# Patient Record
Sex: FEMALE | URBAN - METROPOLITAN AREA
[De-identification: names, ages, dates, MRNs, and addresses within clinical notes are randomized per-mention and may not be internally consistent; named-entity substitution may affect disease eponyms.]

---

## 2018-04-03 ENCOUNTER — APPOINTMENT (RX ONLY)
Dept: URBAN - METROPOLITAN AREA CLINIC 38 | Facility: CLINIC | Age: 33
Setting detail: DERMATOLOGY
End: 2018-04-03

## 2018-04-03 DIAGNOSIS — D18.0 HEMANGIOMA: ICD-10-CM

## 2018-04-03 PROBLEM — L29.8 OTHER PRURITUS: Status: ACTIVE | Noted: 2018-04-03

## 2018-04-03 PROBLEM — D18.01 HEMANGIOMA OF SKIN AND SUBCUTANEOUS TISSUE: Status: ACTIVE | Noted: 2018-04-03

## 2018-04-03 PROBLEM — I48.91 UNSPECIFIED ATRIAL FIBRILLATION: Status: ACTIVE | Noted: 2018-04-03

## 2018-04-03 PROCEDURE — 99202 OFFICE O/P NEW SF 15 MIN: CPT

## 2018-04-03 NOTE — HPI: FREE FORM (INITIAL HISTORY)
How Severe Is Your Skin Condition? (The Patient Describes The Severity Level As....): moderate
What Brings You In Today? (This Is An Xx Year Old Patient Who Presents For...): Lesion
When Did You First Notice It? (The Patient First Noticed It...): 2 years
Where On Your Body Is It? (Located On...): Right thigh
Any Associated Symptoms? (The Symptoms Include.....): Red
What Previous Treatments Have You Tried? (The Patient Has Tried The Following Treatments...): No treatment
Did Anything Happen To Make You Want To Come In For This Specifically Today? (The Specific Reason That The Patient Came In Today Was Because:): Evaluation and treatment

## 2024-03-13 ENCOUNTER — APPOINTMENT (RX ONLY)
Dept: URBAN - METROPOLITAN AREA CLINIC 151 | Facility: CLINIC | Age: 39
Setting detail: DERMATOLOGY
End: 2024-03-13

## 2024-03-13 DIAGNOSIS — L64.8 OTHER ANDROGENIC ALOPECIA: ICD-10-CM

## 2024-03-13 PROCEDURE — ? COUNSELING

## 2024-03-13 PROCEDURE — 99204 OFFICE O/P NEW MOD 45 MIN: CPT

## 2024-03-13 PROCEDURE — ? ORDER TESTS

## 2024-03-13 PROCEDURE — ? DIAGNOSIS COMMENT

## 2024-03-13 NOTE — HPI: OTHER
Condition:: Hair loss
Please Describe Your Condition:: is a new patient who is being seen for a chief complaint of hair loss. A new patient here today for hair loss that has been present for several years. Patient is on a medication (Prolia) prescription for giant cell tumor and has been for several years. Pt unsure if both are related. Patient has tried many topicals and orals but has not seen improvement, OTC. Scalp is asymptomatic. Pt otherwise in good health.

## 2024-03-13 NOTE — PROCEDURE: ORDER TESTS
Performing Laboratory: [id number]
Billing Type: Third-Party Bill
Lab Facility: [id number]
Expected Date Of Service: 03/13/2024
Bill For Surgical Tray: no

## 2024-03-13 NOTE — PROCEDURE: DIAGNOSIS COMMENT
Comment: Mild AGA. Lab work up as below. Discussed oral treatment (Spironolactone, Minoxidil) or topicals. Pt will start with topicals given mild nature (discussed initial shedding, irritation, and hypertrichosis). Follow up in 3 months if no improvement will try prior treatments
Detail Level: Detailed
Render Risk Assessment In Note?: no